# Patient Record
Sex: MALE | Race: WHITE | Employment: STUDENT | URBAN - METROPOLITAN AREA
[De-identification: names, ages, dates, MRNs, and addresses within clinical notes are randomized per-mention and may not be internally consistent; named-entity substitution may affect disease eponyms.]

---

## 2017-01-13 ENCOUNTER — OFFICE VISIT (OUTPATIENT)
Dept: INTERNAL MEDICINE | Facility: CLINIC | Age: 22
End: 2017-01-13

## 2017-01-13 VITALS
BODY MASS INDEX: 22.08 KG/M2 | WEIGHT: 149.6 LBS | RESPIRATION RATE: 16 BRPM | SYSTOLIC BLOOD PRESSURE: 111 MMHG | HEART RATE: 96 BPM | DIASTOLIC BLOOD PRESSURE: 63 MMHG

## 2017-01-13 DIAGNOSIS — Z13.220 SCREENING CHOLESTEROL LEVEL: ICD-10-CM

## 2017-01-13 DIAGNOSIS — Z23 NEED FOR PROPHYLACTIC VACCINATION AND INOCULATION AGAINST INFLUENZA: Primary | ICD-10-CM

## 2017-01-13 DIAGNOSIS — Z13.1 SCREENING FOR DIABETES MELLITUS: ICD-10-CM

## 2017-01-13 RX ORDER — GABAPENTIN 300 MG/1
300 CAPSULE ORAL DAILY
COMMUNITY

## 2017-01-13 RX ORDER — QUETIAPINE FUMARATE 50 MG/1
TABLET, FILM COATED ORAL DAILY PRN
COMMUNITY

## 2017-01-13 ASSESSMENT — PAIN SCALES - GENERAL: PAINLEVEL: NO PAIN (1)

## 2017-01-13 NOTE — PROGRESS NOTES
Health Maintenance Due   Topic Date Due     HPV IMMUNIZATION (1 of 3 - Male 3 Dose Series) 11/07/2005     ASTHMA ACTION PLAN Q1 YR (NO INBASKET)  06/26/2013     ASTHMA CONTROL TEST Q6 MOS (NO INBASKET)  10/16/2013     INFLUENZA VACCINE (SYSTEM ASSIGNED)  09/01/2016       The following immunizations were discussed, but NOT ordered:   - Human Papillomavirus (HPV)  The orders were not placed because: patient declined.  Patient states that he has had all 3 HPV vaccines.

## 2017-01-13 NOTE — NURSING NOTE
FLU VACCINE QUESTIONNAIRE:  Ask the following questions of all parties who want influenza vaccination:     CONTRAINDICATIONS  1.  Is the patient age less than 6 months?  NO  2.  Has the person to be vaccinated ever had Guillain-Thomas syndrome? NO  3.  Has the person to be vaccinated had the vaccine this year? NO  4.  Is the person to be vaccinated sick today? NO  5.  Does the person to be vaccinated have an allergy to eggs or a component of the vaccine? NO  6.  Has the person to vaccinated ever had a serious reaction to an influenza vaccination in the past? NO    RALEIGH MARTIN at 3:19 PM on 1/13/2017.

## 2017-01-13 NOTE — PATIENT INSTRUCTIONS
Primary Care Center Medication Refill Request Information:  * Please contact your pharmacy regarding ANY request for medication refills.  ** [facility] Prescription Fax = [phone]  * Please allow 3 business days for routine medication refills.  * Please allow 5 business days for controlled substance medication refills.     Primary Care Center Test Result notification information:  *You will be notified with in 7-10 days of your appointment day regarding the results of your test.  If you are on MyChart you will be notified as soon as the provider has reviewed the results and signed off on them.    We should check cholesterol FASTING (nothing to eat or drink but black coffee or water for 8-10 hours) and blood sugar at your convenience for the next few weeks.    Khushbu Cortez -[phone]    Mederma or similar over the counter scar reduction cream and continued limiting sun exposure to the area    Preventive Health Recommendations  Male Ages 18 - 25     Yearly exam:             See your health care provider every year in order to  o   Review health changes.   o   Discuss preventive care.    o   Review your medicines if your doctor has prescribed any.    You should be tested each year for STDs (sexually transmitted diseases).     Talk to your provider about cholesterol testing.      If you are at risk for diabetes, you should have a diabetes test (fasting glucose).    Shots: Get a flu shot each year. Get a tetanus shot every 10 years.     Nutrition:    Eat at least 5 servings of fruits and vegetables daily.     Eat whole-grain bread, whole-wheat pasta and brown rice instead of white grains and rice.     Talk to your provider about calcium and Vitamin D.     Lifestyle    Exercise for at least 150 minutes a week (30 minutes a day, 5 days a week). This will help you control your weight and prevent disease.     Limit alcohol to one drink per day.     No smoking.     Wear sunscreen to prevent skin cancer.     See your  dentist every six months for an exam and cleaning.

## 2017-01-13 NOTE — NURSING NOTE
Chief Complaint   Patient presents with     [facility]     patient states he is here for a physical     RALEIGH MARTIN at 2:18 PM on 1/13/2017.

## 2017-01-13 NOTE — MR AVS SNAPSHOT
After Visit Summary   1/13/2017    Jerry Doty    MRN: [id number]           Patient Information     Date Of Birth          1995        Visit Information        Provider Department      1/13/2017 2:00 PM Florida Bean MD [facility]        Today's Diagnoses     Need for prophylactic vaccination and inoculation against influenza    -  1     Screening for diabetes mellitus         Screening cholesterol level           Care Instructions    Primary Care Center Medication Refill Request Information:  * Please contact your pharmacy regarding ANY request for medication refills.  ** [facility] Prescription Fax = [phone]  * Please allow 3 business days for routine medication refills.  * Please allow 5 business days for controlled substance medication refills.     Primary Care Center Test Result notification information:  *You will be notified with in 7-10 days of your appointment day regarding the results of your test.  If you are on MyChart you will be notified as soon as the provider has reviewed the results and signed off on them.    We should check cholesterol FASTING (nothing to eat or drink but black coffee or water for 8-10 hours) and blood sugar at your convenience for the next few weeks.    Khushbu Cortez -[phone]    Mederma or similar over the counter scar reduction cream and continued limiting sun exposure to the area    Preventive Health Recommendations  Male Ages 18 - 25     Yearly exam:             See your health care provider every year in order to  o   Review health changes.   o   Discuss preventive care.    o   Review your medicines if your doctor has prescribed any.    You should be tested each year for STDs (sexually transmitted diseases).     Talk to your provider about cholesterol testing.      If you are at risk for diabetes, you should have a diabetes test (fasting glucose).    Shots: Get a flu shot each year. Get a tetanus shot  every 10 years.     Nutrition:    Eat at least 5 servings of fruits and vegetables daily.     Eat whole-grain bread, whole-wheat pasta and brown rice instead of white grains and rice.     Talk to your provider about calcium and Vitamin D.     Lifestyle    Exercise for at least 150 minutes a week (30 minutes a day, 5 days a week). This will help you control your weight and prevent disease.     Limit alcohol to one drink per day.     No smoking.     Wear sunscreen to prevent skin cancer.     See your dentist every six months for an exam and cleaning.             Follow-ups after your visit        Future tests that were ordered for you today     Open Future Orders        Priority Expected Expires Ordered    Glucose Routine  2018    Lipid panel reflex to direct LDL Routine  2018            Who to contact     Please call your clinic at [phone] to:    Ask questions about your health    Make or cancel appointments    Discuss your medicines    Learn about your test results    Speak to your doctor   If you have compliments or concerns about an experience at your clinic, or if you wish to file a complaint, please contact [facility] Patient Relations at [phone] or email us at [email or web address]         Additional Information About Your Visit        RocksBoxharYoovi Information     Adknowledget is an electronic gateway that provides easy, online access to your medical records. With Cityblis, you can request a clinic appointment, read your test results, renew a prescription or communicate with your care team.     To sign up for Adknowledget visit the website at [email or web address]   You will be asked to enter the access code listed below, as well as some personal information. Please follow the directions to create your username and password.     Your access code is: [email or web address]  Expires: 2017  6:30 AM     Your access code will  in 90 days. If you  need help or a new code, please contact your [facility] or call [phone] for assistance.        Care EveryWhere ID     This is your Care EveryWhere ID. This could be used by other organizations to access your [place] medical records  [id number]        Your Vitals Were     Pulse Respirations                96 16           Blood Pressure from Last 3 Encounters:   01/13/17 111/63   07/24/15 118/68   07/17/15 132/74    Weight from Last 3 Encounters:   01/13/17 67.858 kg (149 lb 9.6 oz)   07/24/15 67.677 kg (149 lb 3.2 oz)   10/18/14 66.225 kg (146 lb) (34.34 %*)     * Growth percentiles are based on [facility] 2-20 Years data.              We Performed the Following     FLU VACCINE, 3 YRS +, IM  [[id number]]          Today's Medication Changes          These changes are accurate as of: 1/13/17  3:12 PM.  If you have any questions, ask your nurse or doctor.               Stop taking these medicines if you haven't already. Please contact your care team if you have questions.     albuterol 108 (90 BASE) MCG/ACT Inhaler   Commonly known as:  albuterol   Stopped by:  Florida Bean MD                    Primary Care Provider    Physician No Ref-Primary       No address on file        Thank you!     Thank you for choosing [facility]  for your care. Our goal is always to provide you with excellent care. Hearing back from our patients is one way we can continue to improve our services. Please take a few minutes to complete the written survey that you may receive in the mail after your visit with us. Thank you!             Your Updated Medication List - Protect others around you: Learn how to safely use, store and throw away your medicines at www.disposemymeds.org.          This list is accurate as of: 1/13/17  3:12 PM.  Always use your most recent med list.                   Brand Name Dispense Instructions for use    ADDERALL 20 MG per tablet   Generic drug:   amphetamine-dextroamphetamine      Take 10 mg by mouth daily       ESCITALOPRAM OXALATE PO      Take 10 mg by mouth daily       gabapentin 300 MG capsule    NEURONTIN     Take 300 mg by mouth 3 times daily       HYDROXYZINE HCL PO      Take 25 mg by mouth       SEROQUEL 50 MG tablet   Generic drug:  QUEtiapine      Take by mouth daily as needed

## 2017-05-07 ENCOUNTER — TRANSFERRED RECORDS (OUTPATIENT)
Dept: HEALTH INFORMATION MANAGEMENT | Facility: CLINIC | Age: 22
End: 2017-05-07

## 2017-05-08 DIAGNOSIS — M25.512 LEFT SHOULDER PAIN, UNSPECIFIED CHRONICITY: Primary | ICD-10-CM

## 2017-05-09 ENCOUNTER — OFFICE VISIT (OUTPATIENT)
Dept: ORTHOPEDICS | Facility: CLINIC | Age: 22
End: 2017-05-09

## 2017-05-09 ENCOUNTER — PRE VISIT (OUTPATIENT)
Dept: ORTHOPEDICS | Facility: CLINIC | Age: 22
End: 2017-05-09

## 2017-05-09 VITALS — HEIGHT: 69 IN | BODY MASS INDEX: 21.48 KG/M2 | WEIGHT: 145 LBS

## 2017-05-09 DIAGNOSIS — S42.022A CLOSED DISPLACED FRACTURE OF SHAFT OF LEFT CLAVICLE, INITIAL ENCOUNTER: Primary | ICD-10-CM
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
## 2017-05-09 NOTE — MR AVS SNAPSHOT
After Visit Summary   2017    Jerry Doty    MRN: [id number]           Patient Information     Date Of Birth          1995        Visit Information        Provider Department      2017 9:45 AM Deepak Darden MD [facility]        Today's Diagnoses     Closed displaced fracture of shaft of left clavicle, initial encounter    -  1       Follow-ups after your visit        Your next 10 appointments already scheduled     May 10, 2017 10:15 AM CDT   (Arrive by 10:00 AM)   New Patient Visit with Jayshree Arroyo MD   [facility] ([facility])    [address] [place] [address]  [address] [facility] [address] [phone]              Who to contact     Please call your clinic at [phone] to:    Ask questions about your health    Make or cancel appointments    Discuss your medicines    Learn about your test results    Speak to your doctor   If you have compliments or concerns about an experience at your clinic, or if you wish to file a complaint, please contact [facility] Patient Relations at [phone] or email us at [email or web address]         Additional Information About Your Visit        MyChart Information     Jamppt is an electronic gateway that provides easy, online access to your medical records. With HourlyNerd, you can request a clinic appointment, read your test results, renew a prescription or communicate with your care team.     To sign up for Jamppt visit the website at [email or web address]   You will be asked to enter the access code listed below, as well as some personal information. Please follow the directions to create your username and password.     Your access code is: [email or web address]  Expires: 2017  6:30 AM     Your access code will  in 90 days. If you need help or a new code, please contact your [facility] or  "call [phone] for assistance.        Care EveryWhere ID     This is your Care EveryWhere ID. This could be used by other organizations to access your [place] medical records  [id number]        Your Vitals Were     Height BMI (Body Mass Index)                5' 9\" (1.753 m) 21.41 kg/m2           Blood Pressure from Last 3 Encounters:   01/13/17 111/63   07/24/15 118/68   07/17/15 132/74    Weight from Last 3 Encounters:   05/09/17 145 lb (65.8 kg)   01/13/17 149 lb 9.6 oz (67.9 kg)   07/24/15 149 lb 3.2 oz (67.7 kg)              Today, you had the following     No orders found for display       Primary Care Provider    Physician No Ref-Primary       No address on file        Thank you!     Thank you for choosing [facility]  for your care. Our goal is always to provide you with excellent care. Hearing back from our patients is one way we can continue to improve our services. Please take a few minutes to complete the written survey that you may receive in the mail after your visit with us. Thank you!             Your Updated Medication List - Protect others around you: Learn how to safely use, store and throw away your medicines at www.disposemymeds.org.          This list is accurate as of: 5/9/17  5:14 PM.  Always use your most recent med list.                   Brand Name Dispense Instructions for use    ADDERALL 20 MG per tablet   Generic drug:  amphetamine-dextroamphetamine      Take 10 mg by mouth daily       ESCITALOPRAM OXALATE PO      Take 10 mg by mouth daily       gabapentin 300 MG capsule    NEURONTIN     Take 300 mg by mouth 3 times daily       HYDROXYZINE HCL PO      Take 25 mg by mouth       SEROQUEL 50 MG tablet   Generic drug:  QUEtiapine      Take by mouth daily as needed         "

## 2017-05-10 ENCOUNTER — OFFICE VISIT (OUTPATIENT)
Dept: ORTHOPEDICS | Facility: CLINIC | Age: 22
End: 2017-05-10

## 2017-05-10 VITALS — BODY MASS INDEX: 22.15 KG/M2 | WEIGHT: 154.7 LBS | HEIGHT: 70 IN

## 2017-05-10 DIAGNOSIS — S42.002A FRACTURE OF LEFT CLAVICLE: Primary | ICD-10-CM

## 2017-05-10 DIAGNOSIS — S42.022A CLOSED DISPLACED FRACTURE OF SHAFT OF LEFT CLAVICLE, INITIAL ENCOUNTER: Primary | ICD-10-CM

## 2017-05-10 ASSESSMENT — ENCOUNTER SYMPTOMS
COUGH DISTURBING SLEEP: 1
SNORES LOUDLY: 0
SINUS CONGESTION: 1
POSTURAL DYSPNEA: 0
DEPRESSION: 1
WEIGHT LOSS: 0
RESPIRATORY PAIN: 0
MUSCLE CRAMPS: 0
NIGHT SWEATS: 0
TROUBLE SWALLOWING: 0
PANIC: 0
WEIGHT GAIN: 0
NECK MASS: 0
TASTE DISTURBANCE: 0
SINUS PAIN: 0
HOARSE VOICE: 0
FEVER: 0
NECK PAIN: 0
DECREASED CONCENTRATION: 1
BACK PAIN: 0
HALLUCINATIONS: 0
HEMOPTYSIS: 0
POLYPHAGIA: 0
SORE THROAT: 0
ARTHRALGIAS: 0
DYSPNEA ON EXERTION: 0
CHILLS: 0
ALTERED TEMPERATURE REGULATION: 0
NERVOUS/ANXIOUS: 1
MYALGIAS: 0
INSOMNIA: 1
WHEEZING: 0
MUSCLE WEAKNESS: 0
SMELL DISTURBANCE: 0
STIFFNESS: 0
POLYDIPSIA: 0
SPUTUM PRODUCTION: 1
DECREASED APPETITE: 1
INCREASED ENERGY: 1
FATIGUE: 0
JOINT SWELLING: 0
COUGH: 1
SHORTNESS OF BREATH: 0
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
## 2017-05-10 NOTE — MR AVS SNAPSHOT
After Visit Summary   5/10/2017    Jerry Doty    MRN: [id number]           Patient Information     Date Of Birth          1995        Visit Information        Provider Department      5/10/2017 10:15 AM Jayshree Arroyo MD [facility]        Today's Diagnoses     Closed displaced fracture of shaft of left clavicle, initial encounter    -  1       Follow-ups after your visit        Your next 10 appointments already scheduled     Jun 09, 2017  1:30 PM CDT   XR CLAVICLE 2 VIEWS AP AND AXIAL with [id number]   [facility] ([facility])    [address] [place] [id number] [phone]           Please bring a list of your current medicines to your exam. (Include vitamins, minerals and over-thecounter medicines.) Leave your valuables at home.  Tell your doctor if there is a chance you may be pregnant.  You do not need to do anything special for this exam.            Jun 09, 2017  1:45 PM CDT   (Arrive by 1:30 PM)   RETURN SHOULDER with Jayshree Arroyo MD   [facility] ([facility])    [address] [place] [id number] [phone]              Who to contact     Please call your clinic at [phone] to:    Ask questions about your health    Make or cancel appointments    Discuss your medicines    Learn about your test results    Speak to your doctor   If you have compliments or concerns about an experience at your clinic, or if you wish to file a complaint, please contact [facility] Patient Relations at [phone] or email us at [email or web address]         Additional Information About Your Visit        MyChart Information     [employer] is an electronic gateway that provides easy, online access to your medical records. With [employer], you can request a clinic appointment, read your test  "results, renew a prescription or communicate with your care team.     To sign up for MyChart visit the website at [email or web address]   You will be asked to enter the access code listed below, as well as some personal information. Please follow the directions to create your username and password.     Your access code is: [email or web address]  Expires: 2017  6:30 AM     Your access code will  in 90 days. If you need help or a new code, please contact your [facility] or call [phone] for assistance.        Care EveryWhere ID     This is your Care EveryWhere ID. This could be used by other organizations to access your [place] medical records  [id number]        Your Vitals Were     Height BMI (Body Mass Index)                1.778 m (5' 10\") 22.2 kg/m2           Blood Pressure from Last 3 Encounters:   17 111/63   07/24/15 118/68   07/17/15 132/74    Weight from Last 3 Encounters:   17 68.8 kg (151 lb 11.2 oz)   05/10/17 70.2 kg (154 lb 11.2 oz)   17 65.8 kg (145 lb)              Today, you had the following     No orders found for display       Primary Care Provider    Physician No Ref-Primary       No address on file        Thank you!     Thank you for choosing [facility]  for your care. Our goal is always to provide you with excellent care. Hearing back from our patients is one way we can continue to improve our services. Please take a few minutes to complete the written survey that you may receive in the mail after your visit with us. Thank you!             Your Updated Medication List - Protect others around you: Learn how to safely use, store and throw away your medicines at www.disposemymeds.org.          This list is accurate as of: 5/10/17 11:59 PM.  Always use your most recent med list.                   Brand Name Dispense Instructions for use    ADDERALL 20 MG per tablet   Generic drug:  amphetamine-dextroamphetamine      Take " 10 mg by mouth daily       ESCITALOPRAM OXALATE PO      Take 10 mg by mouth daily       gabapentin 300 MG capsule    NEURONTIN     Take 300 mg by mouth daily       HYDROXYZINE HCL PO      Take 25 mg by mouth       SEROQUEL 50 MG tablet   Generic drug:  QUEtiapine      Take by mouth daily as needed
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
## 2017-05-16 DIAGNOSIS — S42.009A CLAVICLE FRACTURE: Primary | ICD-10-CM

## 2017-05-17 ENCOUNTER — OFFICE VISIT (OUTPATIENT)
Dept: ORTHOPEDICS | Facility: CLINIC | Age: 22
End: 2017-05-17

## 2017-05-17 VITALS — WEIGHT: 151.7 LBS | BODY MASS INDEX: 21.72 KG/M2 | HEIGHT: 70 IN

## 2017-05-17 DIAGNOSIS — S42.022D CLOSED DISPLACED FRACTURE OF SHAFT OF LEFT CLAVICLE WITH ROUTINE HEALING, SUBSEQUENT ENCOUNTER: Primary | ICD-10-CM

## 2017-05-17 NOTE — MR AVS SNAPSHOT
After Visit Summary   2017    Jerry Doty    MRN: [id number]           Patient Information     Date Of Birth          1995        Visit Information        Provider Department      2017 1:15 PM Jayshree Arroyo MD [facility]        Today's Diagnoses     Closed displaced fracture of shaft of left clavicle with routine healing, subsequent encounter    -  1       Follow-ups after your visit        Who to contact     Please call your clinic at [phone] to:    Ask questions about your health    Make or cancel appointments    Discuss your medicines    Learn about your test results    Speak to your doctor   If you have compliments or concerns about an experience at your clinic, or if you wish to file a complaint, please contact [facility] Patient Relations at [phone] or email us at [email or web address]         Additional Information About Your Visit        MyChart Information     [employer] is an electronic gateway that provides easy, online access to your medical records. With [employer], you can request a clinic appointment, read your test results, renew a prescription or communicate with your care team.     To sign up for [employer] visit the website at [email or web address]   You will be asked to enter the access code listed below, as well as some personal information. Please follow the directions to create your username and password.     Your access code is: [email or web address]  Expires: 2017  6:30 AM     Your access code will  in 90 days. If you need help or a new code, please contact your [facility] or call [phone] for assistance.        Care EveryWhere ID     This is your Care EveryWhere ID. This could be used by other organizations to access your [place] medical records  [id number]        Your Vitals Were     Height BMI (Body Mass Index)                1.778 m (5'  "10\") 21.77 kg/m2           Blood Pressure from Last 3 Encounters:   01/13/17 111/63   07/24/15 118/68   07/17/15 132/74    Weight from Last 3 Encounters:   05/17/17 68.8 kg (151 lb 11.2 oz)   05/10/17 70.2 kg (154 lb 11.2 oz)   05/09/17 65.8 kg (145 lb)              Today, you had the following     No orders found for display       Primary Care Provider    Physician No Ref-Primary       No address on file        Thank you!     Thank you for choosing [facility]  for your care. Our goal is always to provide you with excellent care. Hearing back from our patients is one way we can continue to improve our services. Please take a few minutes to complete the written survey that you may receive in the mail after your visit with us. Thank you!             Your Updated Medication List - Protect others around you: Learn how to safely use, store and throw away your medicines at www.disposemymeds.org.          This list is accurate as of: 5/17/17 11:59 PM.  Always use your most recent med list.                   Brand Name Dispense Instructions for use    ADDERALL 20 MG per tablet   Generic drug:  amphetamine-dextroamphetamine      Take 10 mg by mouth daily       ESCITALOPRAM OXALATE PO      Take 10 mg by mouth daily       gabapentin 300 MG capsule    NEURONTIN     Take 300 mg by mouth daily       HYDROXYZINE HCL PO      Take 25 mg by mouth       SEROQUEL 50 MG tablet   Generic drug:  QUEtiapine      Take by mouth daily as needed         "
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
## 2017-05-17 NOTE — LETTER
5/17/2017       RE: Jerry Doty  [address]  [place] [address]     Dear Colleague,    Thank you for referring your patient, Jerry Doty, to the [facility] at [facility]. Please see a copy of my visit note below.    CHIEF CONCERN: Left clavicle fracture  DATE OF INJURY: 5/7/17    HISTORY OF PRESENT ILLNESS: Jerry returns today 10 days s/p his injury. At his previous visit when we first discussed treatment, we reviewed the risks and benefits of both nonop and operative care. He and his mother wished to pursue nonop treatment in the first week and see how he felt, returning today to review his options. He says today that his pain is quite a bit better. He is no longer concerned so much about the bump from any fracture callous. He is leaning toward nonop treatment.    PHYSICAL EXAM:  Young adult male in NAD  Respirations even and unlabored  LUE: Skin intact over midshaft clavicle fracture. Resolving mild ecchymosis. Minimal tenderness to palpation at fracture site. Tolerates some gentle motion in moving around the room.     IMAGING:  Left clavicle xrays demonstrate maintained alignment compared to xrays last week. No further displacement. Rose superior angulation again seen.    ASSESSMENT:  1. Left midshaft clavicle fracture    PLAN:  -Again reviewed risks and benefits of nonop and surgical treatment. Discussed that although he would have a bump or deformity at the fracture site, I think his opportunity or chance for union with nonoperative treatment is high and I would not anticipate restriction in shoulder motion. He agrees and would like to continue with nonoperative care. He will return in 4 weeks.    Again, thank you for allowing me to participate in the care of your patient.      Sincerely,    Jayshree Arroyo MD

## 2017-06-02 DIAGNOSIS — M25.512 LEFT SHOULDER PAIN, UNSPECIFIED CHRONICITY: Primary | ICD-10-CM

## 2017-06-09 ENCOUNTER — OFFICE VISIT (OUTPATIENT)
Dept: ORTHOPEDICS | Facility: CLINIC | Age: 22
End: 2017-06-09

## 2017-06-09 DIAGNOSIS — S42.022D CLOSED DISPLACED FRACTURE OF SHAFT OF LEFT CLAVICLE WITH ROUTINE HEALING, SUBSEQUENT ENCOUNTER: Primary | ICD-10-CM

## 2017-06-09 NOTE — MR AVS SNAPSHOT
After Visit Summary   2017    Jerry Doty    MRN: [id number]           Patient Information     Date Of Birth          1995        Visit Information        Provider Department      2017 1:45 PM Jayshree Arroyo MD [facility]        Today's Diagnoses     Closed displaced fracture of shaft of left clavicle with routine healing, subsequent encounter    -  1       Follow-ups after your visit        Who to contact     Please call your clinic at [phone] to:    Ask questions about your health    Make or cancel appointments    Discuss your medicines    Learn about your test results    Speak to your doctor   If you have compliments or concerns about an experience at your clinic, or if you wish to file a complaint, please contact [facility] Patient Relations at [phone] or email us at [email or web address]         Additional Information About Your Visit        MyChart Information     [employer] is an electronic gateway that provides easy, online access to your medical records. With [employer], you can request a clinic appointment, read your test results, renew a prescription or communicate with your care team.     To sign up for [employer] visit the website at [email or web address]   You will be asked to enter the access code listed below, as well as some personal information. Please follow the directions to create your username and password.     Your access code is: [email or web address]  Expires: 2017  6:30 AM     Your access code will  in 90 days. If you need help or a new code, please contact your [facility] or call [phone] for assistance.        Care EveryWhere ID     This is your Care EveryWhere ID. This could be used by other organizations to access your [place] medical records  [id number]         Blood Pressure from Last 3 Encounters:   17 111/63   07/24/15 118/68    07/17/15 132/74    Weight from Last 3 Encounters:   05/17/17 68.8 kg (151 lb 11.2 oz)   05/10/17 70.2 kg (154 lb 11.2 oz)   05/09/17 65.8 kg (145 lb)              Today, you had the following     No orders found for display       Primary Care Provider    Physician No Ref-Primary       No address on file        Thank you!     Thank you for choosing [facility]  for your care. Our goal is always to provide you with excellent care. Hearing back from our patients is one way we can continue to improve our services. Please take a few minutes to complete the written survey that you may receive in the mail after your visit with us. Thank you!             Your Updated Medication List - Protect others around you: Learn how to safely use, store and throw away your medicines at www.disposemymeds.org.          This list is accurate as of: 6/9/17 11:59 PM.  Always use your most recent med list.                   Brand Name Dispense Instructions for use    ADDERALL 20 MG per tablet   Generic drug:  amphetamine-dextroamphetamine      Take 10 mg by mouth daily       ESCITALOPRAM OXALATE PO      Take 10 mg by mouth daily       gabapentin 300 MG capsule    NEURONTIN     Take 300 mg by mouth daily       HYDROXYZINE HCL PO      Take 25 mg by mouth       SEROQUEL 50 MG tablet   Generic drug:  QUEtiapine      Take by mouth daily as needed
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
## 2017-06-13 NOTE — PROGRESS NOTES
CHIEF CONCERN: Left clavicle fracture  DATE OF INJURY: 5/7/17    HISTORY OF PRESENT ILLNESS: Jerry returns today 10 days s/p his injury. At his previous visit when we first discussed treatment, we reviewed the risks and benefits of both nonop and operative care. He and his mother wished to pursue nonop treatment in the first week and see how he felt, returning today to review his options. He says today that his pain is quite a bit better. He is no longer concerned so much about the bump from any fracture callous. He is leaning toward nonop treatment.    PHYSICAL EXAM:  Young adult male in NAD  Respirations even and unlabored  LUE: Skin intact over midshaft clavicle fracture. Resolving mild ecchymosis. Minimal tenderness to palpation at fracture site. Tolerates some gentle motion in moving around the room.     IMAGING:  Left clavicle xrays demonstrate maintained alignment compared to xrays last week. No further displacement. [place] superior angulation again seen.    ASSESSMENT:  1. Left midshaft clavicle fracture    PLAN:  -Again reviewed risks and benefits of nonop and surgical treatment. Discussed that although he would have a bump or deformity at the fracture site, I think his opportunity or chance for union with nonoperative treatment is high and I would not anticipate restriction in shoulder motion. He agrees and would like to continue with nonoperative care. He will return in 4 weeks.

## 2020-03-14 ENCOUNTER — VIRTUAL VISIT (OUTPATIENT)
Dept: FAMILY MEDICINE | Facility: OTHER | Age: 25
End: 2020-03-14

## 2020-03-19 NOTE — PROGRESS NOTES
"Date: 2020 18:57:10  Clinician: May Jonas  Clinician NPI: [id number]  Patient: Jerry uBllock  Patient : 1995  Patient Address: [facility] [address], [place] [address]  Patient Phone: [phone]  Visit Protocol: URI  Patient Summary:  Jerry is a 24 year old ( : 1995 ) male who initiated a Visit for COVID-19 (Coronavirus) evaluation and screening. When asked the question \"Please sign me up to receive news, health information and promotions from [employer].\", Jerry responded \"No\".    Jerry states his symptoms started 1-2 days ago.   His symptoms consist of malaise, facial pain or pressure, chills, a sore throat, a cough, and nasal congestion. He is experiencing difficulty breathing due to nasal congestion but he is not short of breath.   Symptom details     Nasal secretions: The color of his mucus is clear.    Cough: Jerry coughs a few times an hour and his cough is more bothersome at night. Phlegm comes into his throat when he coughs. He believes his cough is caused by post-nasal drip. The color of the phlegm is yellow.     Sore throat: Jerry reports having mild throat pain (1-3 on a 10 point pain scale), does not have exudate on his tonsils, and can swallow liquids. He is not sure if the lymph nodes in his neck are enlarged. A rash has not appeared on the skin since the sore throat started.     Facial pain or pressure: The facial pain or pressure feels worse when bending over or leaning forward.      Jerry denies having teeth pain, ear pain, headache, rhinitis, myalgias, wheezing, and fever. He also denies having recent facial or sinus surgery in the past 60 days and taking antibiotic medication for the symptoms.   Precipitating events  Jerry is not sure if he has been exposed to someone with strep throat. He has not recently been exposed to someone with influenza. Jerry has been in close contact with the following high risk individuals: people with asthma, heart disease or diabetes.   " Pertinent COVID-19 (Coronavirus) information  Jerry has not traveled internationally or to the areas where COVID-19 (Coronavirus) is widespread in the last 14 days before the start of his symptoms.   Jerry has not had close contact with a suspected or laboratory-confirmed COVID-19 patient within 14 days of symptom onset.   Jerry is not a healthcare worker or does not work in a healthcare facility.   Pertinent medical history  Jerry needs a return to work/school note.   Weight: 155 lbs   eJrry smokes or uses smokeless tobacco.   Additional information as reported by the patient (free text): Chronic asthma   Weight: 155 lbs    MEDICATIONS: gabapentin oral, Lexapro oral, Adderall oral, ALLERGIES: Penicillins, amoxicillin, Erythrocin  Clinician Response:  Dear Jerry,  Based on the information provided, you have a viral upper respiratory infection, otherwise known as a cold. Symptoms vary from person to person, but can include sneezing, coughing, a runny nose, sore throat, and headache and range from mild to severe.  Unfortunately, there are no medications that can cure a cold, so treatment is focused on controlling symptoms as much as possible. Most people gradually feel better until symptoms are gone in 1-2 weeks.  Medication information  Because you have a viral infection, antibiotics will not help you get better. Treating a viral infection with antibiotics could actually make you feel worse.  Unless you are allergic to the over-the-counter medication(s) below, I recommend using:       Acetaminophen (Tylenol or store brand) oral tablet. Take 1-2 tablets by mouth every 4-6 hours to help with the discomfort.      Ibuprofen (Advil or store brand) 200 mg oral tablet. Take 1-3 tablets (200-600 mg) by mouth every 8 hours to help with the discomfort. Make sure to take the ibuprofen with food. Do not exceed 2400 mg in 24 hours.      Dextromethorphan (Robitussin DM or store brand). This medication is a cough suppressant that works  by decreasing the feeling of needing to cough. Please follow the instructions on the package.    A sinus irrigation kit such as Sinus Rinse, Neti Pot, SinuCleanse, or store brand. Be sure to use sterile or previously boiled water to prevent unwanted infections.      Oxymetazoline (Afrin or store brand) nasal spray. Use 1 spray in each nostril 2 times a day for a maximum of 3 days. Using this medication more frequently or longer than recommended may cause nasal congestion to reoccur or worsen. Sinus pressure occurs when the tissues lining your sinuses become swollen and inflamed. Afrin nasal spray decreases the swelling to provide the quickest and most effective relief from sinus pressure.      Over-the-counter medications do not require a prescription. Ask the pharmacist if you have any questions.  Self care  The following tips will keep you as comfortable as possible while you recover:     Rest    Drink plenty of water and other liquids    Take a hot shower to loosen congestion    Use throat lozenges    Gargle with warm salt water (1/4 teaspoon of salt per 8 ounce glass of water)    Suck on frozen items such as popsicles or ice cubes    Drink hot tea with lemon and honey    Take a spoonful of honey to reduce your cough     Also, as your provider, I need you to know that becoming tobacco-free is the most important thing you can do to protect your current and future health.  When to seek care  Please be seen in a clinic or urgent care if new symptoms develop, or symptoms become worse.  Call 911 or go to the emergency room if you feel that your throat is closing off, you suddenly develop a rash, you are unable to swallow fluids, you are drooling, or you are having difficulty breathing.  Additional treatment plan   Dear Jerry,  Based on the information you have provided, it does not appear you need Coronavirus (COVID-19) testing.   At this time, we recommend testing primarily for those people who have symptoms of cough  and fever and have either traveled to a known area of infection or have been exposed to someone with laboratory confirmed Coronavirus by close contact.   Coronavirus - General Information:   The coronavirus infection starts within 14 days of an exposure.  Symptoms are those of a respiratory infection (such as fever, cough).   If you have not had symptoms by day 15, you should be considered uninfected by coronavirus.   Coronavirus - Symptoms:    The coronavirus can cause a respiratory illness, such as bronchitis or pneumonia.  The most common symptoms are: cough, fever, and shortness of breath.   Other symptoms are: body aches, chills, diarrhea, fatigue, headache, runny nose, and sore throat   Coronavirus - Exposure Risk Factors:   Exposure to a person who has been diagnosed with coronavirus.  Travel from an area with recent local transmission of coronavirus.  The CDC (www.cdc.gov) has the most up-to-date list of where the coronavirus outbreak is occurring.   Coronavirus - Spreading:    The virus likely spreads through respiratory droplets produced when a person coughs or sneezes. These respiratory droplets can travel approximately 6 feet and can remain on surfaces. Common disinfectants will kill the virus.  The CDC currently does not recommend healthy people wear masks.   Coronavirus - Protect Yourself:    Avoid close contact with people known to have this new coronavirus infection.  Wash hands often with soap and water or alcohol-based hand .  Avoid touching the eyes, nose or mouth.   Thank you for limiting contact with others, wearing a simple mask to cover your cough, practice good hand hygiene habits and accessing our virtual services where possible to limit the spread of this virus.  For more information about COVID19 and options for caring for yourself at home, please visit the CDC website at https://www.cdc.gov/coronavirus/2019-ncov/about/steps-when-sick.html   For more options for care at [facility]  [place], please visit our website at [email or web address]     Diagnosis: Cough  Diagnosis ICD: R05

## 2021-06-18 ENCOUNTER — OFFICE VISIT (OUTPATIENT)
Dept: URGENT CARE | Facility: URGENT CARE | Age: 26
End: 2021-06-18

## 2021-06-18 VITALS
HEART RATE: 87 BPM | DIASTOLIC BLOOD PRESSURE: 87 MMHG | SYSTOLIC BLOOD PRESSURE: 134 MMHG | TEMPERATURE: 98.4 F | OXYGEN SATURATION: 97 % | BODY MASS INDEX: 25.11 KG/M2 | WEIGHT: 175 LBS

## 2021-06-18 DIAGNOSIS — T23.232A PARTIAL THICKNESS BURN OF MULTIPLE FINGERS OF LEFT HAND EXCLUDING THUMB, INITIAL ENCOUNTER: Primary | ICD-10-CM

## 2021-06-18 PROCEDURE — 99202 OFFICE O/P NEW SF 15 MIN: CPT | Performed by: FAMILY MEDICINE
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
## 2022-03-17 NOTE — TELEPHONE ENCOUNTER
DIAGNOSIS: Left knee - no imaging / Self referral   APPOINTMENT DATE: 4.4.22   NOTES STATUS DETAILS   MEDICATION LIST Internal

## 2022-04-04 ENCOUNTER — OFFICE VISIT (OUTPATIENT)
Dept: ORTHOPEDICS | Facility: CLINIC | Age: 27
End: 2022-04-04

## 2022-04-04 ENCOUNTER — PRE VISIT (OUTPATIENT)
Dept: ORTHOPEDICS | Facility: CLINIC | Age: 27
End: 2022-04-04

## 2022-04-04 VITALS — WEIGHT: 175 LBS | BODY MASS INDEX: 25.11 KG/M2

## 2022-04-04 DIAGNOSIS — M25.562 PATELLOFEMORAL ARTHRALGIA OF LEFT KNEE: Primary | ICD-10-CM

## 2022-04-04 PROCEDURE — 99202 OFFICE O/P NEW SF 15 MIN: CPT | Performed by: FAMILY MEDICINE
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
## 2022-04-04 NOTE — LETTER
Date:April 6, 2022      Patient was self referred, no letter generated. Do not send.        [facility] Information

## 2023-01-03 NOTE — PROGRESS NOTES
"  SUBJECTIVE:     CC: Jerry HENRIQUEZ Lilian Doty is an 21 year old male who presents for preventative health visit.       Has some underlying behavioral health issues, seeing psychiatrist for last 3 years- DBT 2x/week for last 7 months or so- feels like its working well for him.    Some concern over subjective weight loss and inconsistent appetite over the past year. He has been told by others that he appears to have lost weight especially over the last month. He does not own a scale and does not want to buy one. He indicates that his appetite is inconsistent. He will occasionally skip eating for one to two days. When told that his BMI is 22, he indicates that he understands this is within the normal range. Mr. Doty indicates that he was concerned it was lower, near 15, but is relieved that the results are within normal limits. No nausea, vomiting, diarrhea, constipation, abdominal pain. He is not interested in seeing a nutritionist at this time as he already feels overburdened with appointments.     Having some considerations of his gender, not sure if he is fully wanting to transition to a woman, but wondering what would happen if he took estrogen pills. Is undergoing laser hair removal currently from \"my shoulders up\"     Would like scars looked at as well       Social History   Substance Use Topics     Smoking status: Former Smoker     Smokeless tobacco: Never Used     Alcohol Use: Yes     ETOH Use- drinks 3-4 beer/wine drinks a couple of times a week and every few month binges with about 8 drinks\    Last PSA: No results found for: PSA    No results for input(s): CHOL, HDL, LDL, TRIG, CHOLHDLRATIO, NHDL in the last 46384 hours.    Reviewed orders with patient. Reviewed health maintenance and updated orders accordingly - Yes    All Histories reviewed and updated in Epic.      ROS:  C: NEGATIVE for fever, chills, change in weight  I: NEGATIVE for worrisome rashes, moles or lesions  E: NEGATIVE for vision " Midline attempted to LUE per pt. Request but unable to thread guidewire. Small to moderate hematoma noted after discontinuation. RUE midline placed without difficulty. MIGUEL Diaz updated.   changes or irritation  ENT: NEGATIVE for ear, mouth and throat problems  R: NEGATIVE for significant cough or SOB  CV: NEGATIVE for chest pain, palpitations or peripheral edema  GI: NEGATIVE for nausea, abdominal pain, heartburn, or change in bowel habits   male: negative for dysuria, hematuria, decreased urinary stream, erectile dysfunction, urethral discharge  M: NEGATIVE for significant arthralgias or myalgia  N: NEGATIVE for weakness, dizziness or paresthesias  P: NEGATIVE for changes in mood or affect    BP Readings from Last 3 Encounters:   01/13/17 111/63   07/24/15 118/68   07/17/15 132/74    Wt Readings from Last 3 Encounters:   01/13/17 67.858 kg (149 lb 9.6 oz)   07/24/15 67.677 kg (149 lb 3.2 oz)   10/18/14 66.225 kg (146 lb) (34.34 %*)     * Growth percentiles are based on [facility] [place][note 2] 2-20 Years data.                  OBJECTIVE:     /63 mmHg  Pulse 96  Resp 16  Wt 67.858 kg (149 lb 9.6 oz)  EXAM:  GENERAL: healthy, alert and no distress  EYES: Eyes grossly normal to inspection  HENT: ear canals and TM's normal, nose and mouth without ulcers or lesions  NECK: no adenopathy, no asymmetry, masses, or scars and thyroid normal to palpation  RESP: lungs clear to auscultation - no rales, rhonchi or wheezes  CV: regular rate and rhythm, normal S1 S2, no S3 or S4, no murmur, click or rub, no peripheral edema and peripheral pulses strong  ABDOMEN: soft, nontender, no hepatosplenomegaly, no masses and bowel sounds normal  MS: no gross musculoskeletal defects noted, no edema  SKIN: scars right upper chest with keloid formation, multiple areas of excoriations, punctate from picking at  NEURO: Normal strength and tone, mentation intact and speech normal  PSYCH: mentation appears normal, affect normal/bright    ASSESSMENT/PLAN:     1. Need for prophylactic vaccination and inoculation against influenza    - FLU VACCINE, 3 YRS +, IM  [40884]    2. Screening for diabetes mellitus    - Glucose; Future    3. Screening  "cholesterol level    - Lipid panel reflex to direct LDL; Future    Discussed concerns of gender transition- offered [facility], doesn't feel hes ready to commit to that program yet, but has more exploratory questions, I did refer to Khushbu Payne at the [facility] who has expertise in this area in a primary care setting,    Scars- over the counter mederma, sun avoidance    COUNSELING:  Reviewed preventive health counseling, as reflected in patient instructions       reports that he has quit smoking. He has never used smokeless tobacco.    Estimated body mass index is 22.08 kg/(m^2) as calculated from the following:    Height as of 7/17/15: 1.753 m (5' 9\").    Weight as of this encounter: 67.858 kg (149 lb 9.6 oz).       Counseling Resources:  ATP IV Guidelines  Pooled Cohorts Equation Calculator  FRAX Risk Assessment  ICSI Preventive Guidelines  Dietary Guidelines for Americans, 2010  USDA's MyPlate  ASA Prophylaxis  Lung CA Screening    Florida Bean MD  [facility] [place] [facility]  "